# Patient Record
Sex: MALE | Race: WHITE | NOT HISPANIC OR LATINO | Employment: FULL TIME | URBAN - METROPOLITAN AREA
[De-identification: names, ages, dates, MRNs, and addresses within clinical notes are randomized per-mention and may not be internally consistent; named-entity substitution may affect disease eponyms.]

---

## 2023-03-12 ENCOUNTER — HOSPITAL ENCOUNTER (EMERGENCY)
Facility: MEDICAL CENTER | Age: 28
End: 2023-03-12
Attending: EMERGENCY MEDICINE

## 2023-03-12 ENCOUNTER — APPOINTMENT (OUTPATIENT)
Dept: RADIOLOGY | Facility: MEDICAL CENTER | Age: 28
End: 2023-03-12
Attending: EMERGENCY MEDICINE

## 2023-03-12 VITALS
WEIGHT: 153.66 LBS | HEIGHT: 73 IN | HEART RATE: 105 BPM | RESPIRATION RATE: 18 BRPM | OXYGEN SATURATION: 95 % | DIASTOLIC BLOOD PRESSURE: 66 MMHG | SYSTOLIC BLOOD PRESSURE: 106 MMHG | TEMPERATURE: 98.7 F | BODY MASS INDEX: 20.37 KG/M2

## 2023-03-12 DIAGNOSIS — S90.32XA CONTUSION OF LEFT FOOT, INITIAL ENCOUNTER: ICD-10-CM

## 2023-03-12 PROCEDURE — A9270 NON-COVERED ITEM OR SERVICE: HCPCS | Performed by: EMERGENCY MEDICINE

## 2023-03-12 PROCEDURE — 73630 X-RAY EXAM OF FOOT: CPT | Mod: LT

## 2023-03-12 PROCEDURE — 99284 EMERGENCY DEPT VISIT MOD MDM: CPT

## 2023-03-12 PROCEDURE — 700102 HCHG RX REV CODE 250 W/ 637 OVERRIDE(OP): Performed by: EMERGENCY MEDICINE

## 2023-03-12 PROCEDURE — 73610 X-RAY EXAM OF ANKLE: CPT | Mod: LT

## 2023-03-12 RX ORDER — ACETAMINOPHEN 325 MG/1
650 TABLET ORAL ONCE
Status: COMPLETED | OUTPATIENT
Start: 2023-03-12 | End: 2023-03-12

## 2023-03-12 RX ORDER — IBUPROFEN 600 MG/1
600 TABLET ORAL ONCE
Status: COMPLETED | OUTPATIENT
Start: 2023-03-12 | End: 2023-03-12

## 2023-03-12 RX ADMIN — IBUPROFEN 600 MG: 600 TABLET, FILM COATED ORAL at 18:55

## 2023-03-12 RX ADMIN — ACETAMINOPHEN 650 MG: 325 TABLET, FILM COATED ORAL at 18:54

## 2023-03-12 ASSESSMENT — PAIN DESCRIPTION - DESCRIPTORS: DESCRIPTORS: STABBING

## 2023-03-12 NOTE — Clinical Note
Calvin Jacksonbins was seen and treated in our emergency department on 3/12/2023.  He may return to work on 03/15/2023.       If you have any questions or concerns, please don't hesitate to call.      Rafi Gonzalez M.D.

## 2023-03-13 NOTE — ED PROVIDER NOTES
"ED Provider Note            Primary Care Provider: No primary care provider on file.    CHIEF COMPLAINT  Chief Complaint   Patient presents with    Foot Pain     L foot after dropping couch onto foot on Friday     LIMITATION TO HISTORY   Select: : None    HPI/ROS  OUTSIDE HISTORIAN(S):      EXTERNAL RECORDS REVIEWED      Calvin Gold is a 27 y.o. male who presents to the ED for left foot and ankle pain.  Patient dropped a couch while moving on his left foot and ankle 2 days prior.  Since that time has been unable to bear weight and has had progressive pain that he rates as severe.  Slightly better with rest elevation no other modifying factors no pain in any other extremities no other recent illness or concern.      PAST MEDICAL HISTORY  No past medical history on file.    SURGICAL HISTORY  No past surgical history on file.    FAMILY HISTORY  No family history on file.    SOCIAL HISTORY       CURRENT MEDICATIONS  Previous Medications    No medications on file       ALLERGIES  Penicillin g    PHYSICAL EXAM  /70   Pulse (!) 112   Temp 36.8 °C (98.2 °F) (Temporal)   Resp 18   Ht 1.854 m (6' 1\")   Wt 69.7 kg (153 lb 10.6 oz)   SpO2 95%   BMI 20.27 kg/m²   Pulse ox interpretation: I interpret this pulse ox as normal.  Constitutional: Alert and oriented x 3, minimal distress  HEENT: Atraumatic normocephalic, pupils are equal round reactive to light extraocular movements are intact. The nares is clear, external ears are normal, mouth shows moist mucous membranes normal dentition for age  Neck: Supple, no JVD no tracheal deviation  Cardiovascular: Regular rate and rhythm   Thorax & Lungs: No respiratory distress  GI: Soft nontender nondistended positive bowel sounds, no peritoneal signs  Skin: Warm dry no acute rash or lesion  Musculoskeletal: Bilateral upper and right lower extremity unremarkable left lower extremity shows moderate ecchymosis and edema bilateral malleoli into the dorsum of the left foot.  " "Pain with dorsiflexion plantarflexion of the ankle  normal cap refill and sensation all toes.  Neurologic: Cranial nerves III through XII are grossly intact no sensory deficit no cerebellar dysfunction   Psychiatric: Appropriate affect for situation at this time          DIAGNOSTIC STUDIES & PROCEDURES        Radiology:   The attending Emergency Physician has independently interpreted the diagnostic imaging associated with this visit and is awaiting the final reading from the radiologist, which will be displayed below.      DX-FOOT-COMPLETE 3+ LEFT   Final Result      No radiographic evidence of acute displaced fracture or subluxation.      DX-ANKLE 3+ VIEWS LEFT   Final Result      Normal ankle series.           COURSE & MEDICAL DECISION MAKING    ED Observation Status? No; Patient does not meet criteria for ED Observation.     ASSESSMENT AND PLAN  Care Narrative:       6:35 PM - Patient seen and evaluated at bedside.        ADDITIONAL PROBLEM LIST AND DISPOSITION               DISPOSITION AND DISCUSSIONS  I have discussed management of the patient with the following physicians and JAIDEN's:     Discussion of management with other QHP or appropriate source(s): None     Escalation of care considered, and ultimately not performed: .    Barriers to care at this time, including but not limited to: Patient does not have established PCP.     Decision tools and prescription drugs considered including, but not limited to: Pain Medications no clear indication for narcotic pain medicine at this time given instructions on combination acetaminophen and nonsteroidals. .      /66   Pulse (!) 105   Temp 37.1 °C (98.7 °F) (Temporal)   Resp 18   Ht 1.854 m (6' 1\")   Wt 69.7 kg (153 lb 10.6 oz)   SpO2 95%   BMI 20.27 kg/m²     FINAL IMPRESSION  1. Contusion of left foot, initial encounter        PRESCRIPTIONS  There are no discharge medications for this patient.      FOLLOW UP  Shay Peralta M.D.  [address]  [address]  [place] [id number] [phone]    In 1 week  if symptoms persist    [facility], Emergency Dept  [address]  [place] [address] [phone]    in 12-24 hours if symptoms persist, immediately If symptoms worsen, or if you develop any other symptoms or concerns        -DISCHARGE-

## 2023-03-13 NOTE — ED NOTES
Pt D/C to home.D/C instructions and prescriptions given. Pt v/u. Pt leaves ED with no acute changes, complaints or concerns.

## 2023-03-13 NOTE — ED TRIAGE NOTES
Patient presents with complaints of L foot pain after dropping a couch on it 2 days ago. Has been utilizing ice and elevation with little relief. Last used Motrin yesterday morning. Ambulatory with crutches at time of triage.

## 2023-07-05 ENCOUNTER — NON-PROVIDER VISIT (OUTPATIENT)
Dept: OCCUPATIONAL MEDICINE | Facility: CLINIC | Age: 28
End: 2023-07-05

## 2023-07-05 DIAGNOSIS — Z02.89 VISIT FOR OCCUPATIONAL HEALTH EXAMINATION: ICD-10-CM

## 2023-07-05 PROCEDURE — 8907 PR URINE COLLECT ONLY: Performed by: NURSE PRACTITIONER

## 2024-12-01 ENCOUNTER — HOSPITAL ENCOUNTER (EMERGENCY)
Facility: MEDICAL CENTER | Age: 29
End: 2024-12-02
Attending: EMERGENCY MEDICINE

## 2024-12-01 DIAGNOSIS — K80.70 CALCULUS OF GALLBLADDER AND BILE DUCT WITHOUT CHOLECYSTITIS OR OBSTRUCTION: ICD-10-CM

## 2024-12-01 DIAGNOSIS — R11.2 NAUSEA AND VOMITING, UNSPECIFIED VOMITING TYPE: ICD-10-CM

## 2024-12-01 DIAGNOSIS — R10.11 RIGHT UPPER QUADRANT ABDOMINAL PAIN: ICD-10-CM

## 2024-12-01 PROCEDURE — 99284 EMERGENCY DEPT VISIT MOD MDM: CPT

## 2024-12-01 PROCEDURE — 700111 HCHG RX REV CODE 636 W/ 250 OVERRIDE (IP): Mod: JZ | Performed by: EMERGENCY MEDICINE

## 2024-12-01 PROCEDURE — 85025 COMPLETE CBC W/AUTO DIFF WBC: CPT

## 2024-12-01 PROCEDURE — 83690 ASSAY OF LIPASE: CPT

## 2024-12-01 PROCEDURE — 36415 COLL VENOUS BLD VENIPUNCTURE: CPT

## 2024-12-01 PROCEDURE — 96374 THER/PROPH/DIAG INJ IV PUSH: CPT

## 2024-12-01 PROCEDURE — 96375 TX/PRO/DX INJ NEW DRUG ADDON: CPT

## 2024-12-01 PROCEDURE — 80053 COMPREHEN METABOLIC PANEL: CPT

## 2024-12-01 RX ORDER — MORPHINE SULFATE 4 MG/ML
4 INJECTION INTRAVENOUS ONCE
Status: COMPLETED | OUTPATIENT
Start: 2024-12-02 | End: 2024-12-01

## 2024-12-01 RX ORDER — ONDANSETRON 2 MG/ML
4 INJECTION INTRAMUSCULAR; INTRAVENOUS ONCE
Status: COMPLETED | OUTPATIENT
Start: 2024-12-02 | End: 2024-12-01

## 2024-12-01 RX ADMIN — MORPHINE SULFATE 4 MG: 4 INJECTION, SOLUTION INTRAMUSCULAR; INTRAVENOUS at 23:55

## 2024-12-01 RX ADMIN — ONDANSETRON 4 MG: 2 INJECTION INTRAMUSCULAR; INTRAVENOUS at 23:55

## 2024-12-01 ASSESSMENT — PAIN DESCRIPTION - PAIN TYPE
TYPE: ACUTE PAIN
TYPE: ACUTE PAIN

## 2024-12-02 ENCOUNTER — HOSPITAL ENCOUNTER (OUTPATIENT)
Dept: RADIOLOGY | Facility: MEDICAL CENTER | Age: 29
End: 2024-12-02
Attending: EMERGENCY MEDICINE

## 2024-12-02 VITALS
RESPIRATION RATE: 18 BRPM | OXYGEN SATURATION: 94 % | SYSTOLIC BLOOD PRESSURE: 97 MMHG | WEIGHT: 163.14 LBS | HEART RATE: 72 BPM | HEIGHT: 73 IN | BODY MASS INDEX: 21.62 KG/M2 | DIASTOLIC BLOOD PRESSURE: 53 MMHG | TEMPERATURE: 98.6 F

## 2024-12-02 LAB
ALBUMIN SERPL BCP-MCNC: 4.7 G/DL (ref 3.2–4.9)
ALBUMIN/GLOB SERPL: 1.6 G/DL
ALP SERPL-CCNC: 67 U/L (ref 30–99)
ALT SERPL-CCNC: 10 U/L (ref 2–50)
ANION GAP SERPL CALC-SCNC: 14 MMOL/L (ref 7–16)
APPEARANCE UR: ABNORMAL
AST SERPL-CCNC: 9 U/L (ref 12–45)
BACTERIA #/AREA URNS HPF: NORMAL /HPF
BASOPHILS # BLD AUTO: 0.8 % (ref 0–1.8)
BASOPHILS # BLD: 0.14 K/UL (ref 0–0.12)
BILIRUB SERPL-MCNC: <0.2 MG/DL (ref 0.1–1.5)
BILIRUB UR QL STRIP.AUTO: NEGATIVE
BUN SERPL-MCNC: 20 MG/DL (ref 8–22)
CALCIUM ALBUM COR SERPL-MCNC: 9.1 MG/DL (ref 8.5–10.5)
CALCIUM SERPL-MCNC: 9.7 MG/DL (ref 8.5–10.5)
CASTS URNS QL MICRO: NORMAL /LPF (ref 0–2)
CHLORIDE SERPL-SCNC: 100 MMOL/L (ref 96–112)
CO2 SERPL-SCNC: 25 MMOL/L (ref 20–33)
COLOR UR: YELLOW
CREAT SERPL-MCNC: 0.94 MG/DL (ref 0.5–1.4)
EOSINOPHIL # BLD AUTO: 0.24 K/UL (ref 0–0.51)
EOSINOPHIL NFR BLD: 1.3 % (ref 0–6.9)
EPITHELIAL CELLS 1715: NORMAL /HPF (ref 0–5)
ERYTHROCYTE [DISTWIDTH] IN BLOOD BY AUTOMATED COUNT: 44.1 FL (ref 35.9–50)
GFR SERPLBLD CREATININE-BSD FMLA CKD-EPI: 112 ML/MIN/1.73 M 2
GLOBULIN SER CALC-MCNC: 2.9 G/DL (ref 1.9–3.5)
GLUCOSE SERPL-MCNC: 105 MG/DL (ref 65–99)
GLUCOSE UR STRIP.AUTO-MCNC: NEGATIVE MG/DL
HCT VFR BLD AUTO: 46 % (ref 42–52)
HGB BLD-MCNC: 15.5 G/DL (ref 14–18)
IMM GRANULOCYTES # BLD AUTO: 0.19 K/UL (ref 0–0.11)
IMM GRANULOCYTES NFR BLD AUTO: 1 % (ref 0–0.9)
KETONES UR STRIP.AUTO-MCNC: NEGATIVE MG/DL
LEUKOCYTE ESTERASE UR QL STRIP.AUTO: NEGATIVE
LIPASE SERPL-CCNC: 22 U/L (ref 11–82)
LYMPHOCYTES # BLD AUTO: 3.62 K/UL (ref 1–4.8)
LYMPHOCYTES NFR BLD: 20 % (ref 22–41)
MCH RBC QN AUTO: 31.1 PG (ref 27–33)
MCHC RBC AUTO-ENTMCNC: 33.7 G/DL (ref 32.3–36.5)
MCV RBC AUTO: 92.4 FL (ref 81.4–97.8)
MICRO URNS: ABNORMAL
MONOCYTES # BLD AUTO: 0.87 K/UL (ref 0–0.85)
MONOCYTES NFR BLD AUTO: 4.8 % (ref 0–13.4)
NEUTROPHILS # BLD AUTO: 13.07 K/UL (ref 1.82–7.42)
NEUTROPHILS NFR BLD: 72.1 % (ref 44–72)
NITRITE UR QL STRIP.AUTO: NEGATIVE
NRBC # BLD AUTO: 0 K/UL
NRBC BLD-RTO: 0 /100 WBC (ref 0–0.2)
PH UR STRIP.AUTO: 7.5 [PH] (ref 5–8)
PLATELET # BLD AUTO: 316 K/UL (ref 164–446)
PMV BLD AUTO: 9.1 FL (ref 9–12.9)
POTASSIUM SERPL-SCNC: 3.8 MMOL/L (ref 3.6–5.5)
PROT SERPL-MCNC: 7.6 G/DL (ref 6–8.2)
PROT UR QL STRIP: NEGATIVE MG/DL
RBC # BLD AUTO: 4.98 M/UL (ref 4.7–6.1)
RBC # URNS HPF: NORMAL /HPF (ref 0–2)
RBC UR QL AUTO: NEGATIVE
SODIUM SERPL-SCNC: 139 MMOL/L (ref 135–145)
SP GR UR STRIP.AUTO: 1.02
UROBILINOGEN UR STRIP.AUTO-MCNC: 1 EU/DL
WBC # BLD AUTO: 18.1 K/UL (ref 4.8–10.8)
WBC #/AREA URNS HPF: NORMAL /HPF

## 2024-12-02 PROCEDURE — 76705 ECHO EXAM OF ABDOMEN: CPT

## 2024-12-02 PROCEDURE — 81001 URINALYSIS AUTO W/SCOPE: CPT

## 2024-12-02 PROCEDURE — 700111 HCHG RX REV CODE 636 W/ 250 OVERRIDE (IP): Mod: JZ | Performed by: EMERGENCY MEDICINE

## 2024-12-02 PROCEDURE — 96375 TX/PRO/DX INJ NEW DRUG ADDON: CPT

## 2024-12-02 RX ORDER — HALOPERIDOL 5 MG/ML
2.5 INJECTION INTRAMUSCULAR ONCE
Status: COMPLETED | OUTPATIENT
Start: 2024-12-02 | End: 2024-12-02

## 2024-12-02 RX ORDER — ONDANSETRON 4 MG/1
4 TABLET, ORALLY DISINTEGRATING ORAL EVERY 6 HOURS PRN
Qty: 5 TABLET | Refills: 0 | Status: SHIPPED | OUTPATIENT
Start: 2024-12-02

## 2024-12-02 RX ADMIN — HALOPERIDOL LACTATE 2.5 MG: 5 INJECTION, SOLUTION INTRAMUSCULAR at 02:05

## 2024-12-02 NOTE — ED TRIAGE NOTES
".  Chief Complaint   Patient presents with    Abdominal Pain     Right upper quadrant pain, n/v for the last few weeks, worse today       ./79   Pulse 72   Temp 37 °C (98.6 °F) (Temporal)   Resp 20   Ht 1.854 m (6' 1\")   Wt 74 kg (163 lb 2.3 oz)   SpO2 99%   BMI 21.52 kg/m²     .Pt is alert and oriented, speaking in full sentences, follows commands and responds appropriately to questions Resp are even and unlabored.  Skin pink warm and dry     Pt placed in lobby. Pt educated on triage process. Pt encouraged to alert staff for any changes.    "

## 2024-12-02 NOTE — ED PROVIDER NOTES
ED Provider Note    CHIEF COMPLAINT  Chief Complaint   Patient presents with    Abdominal Pain     Right upper quadrant pain, n/v for the last few weeks, worse today        HPI    Primary care provider: Pcp Pt States None   History obtained from: Patient  History limited by: None     Calvin Gold is a 29 y.o. male who presents to the ED complaining of pain localized to the right upper quadrant.  Patient reports that he has had intermittent episodes of pain over the past few weeks usually improved by applying a heating pad.  The pain became excruciating tonight after eating dinner and nothing was making it better which prompted him to come in.  He also reports 2 episodes of nausea and vomiting tonight without gross blood noted.  He denies fever but is feeling very warm due to the severe pain.  He reports normal bowel movements and denies hematochezia or melena.  He denies dysuria or hematuria.  He denies prior abdominal surgeries except for right inguinal hernia surgery.    REVIEW OF SYSTEMS  Please see HPI for pertinent positives/negatives.  All other systems reviewed and are negative.     PAST MEDICAL HISTORY  No past medical history on file.     SURGICAL HISTORY  No past surgical history on file.     SOCIAL HISTORY  Social History     Tobacco Use    Smoking status: Not on file    Smokeless tobacco: Not on file   Substance and Sexual Activity    Alcohol use: Not on file    Drug use: Not on file    Sexual activity: Not on file        FAMILY HISTORY  No family history on file.     CURRENT MEDICATIONS  Home Medications       Reviewed by Stephanie Lewis R.N. (Registered Nurse) on 12/01/24 at 2300  Med List Status: Not Addressed     Medication Last Dose Status        Patient Bayron Taking any Medications                            ALLERGIES  Allergies   Allergen Reactions    Penicillin G Swelling        PHYSICAL EXAM  VITAL SIGNS: BP 97/53   Pulse 72   Temp  "37 °C (98.6 °F) (Temporal)   Resp 18   Ht 1.854 m (6' 1\")   Wt 74 kg (163 lb 2.3 oz)   SpO2 94%   BMI 21.52 kg/m²  @ALBERTO[[id number]::@     Pulse ox interpretation: 99% I interpret this pulse ox as normal     Constitutional: Well developed, well nourished, alert in discomfort, nontoxic appearance    HENT: No external signs of trauma, normocephalic  Eyes: PERRL, conjunctiva without erythema, no discharge, no icterus    Neck: Soft and supple, trachea midline, no stridor, no tenderness, no LAD, good ROM    Cardiovascular: Regular rate and rhythm, no murmurs/rubs/gallops, strong distal pulses and good perfusion    Thorax & Lungs: No respiratory distress, CTAB    Abdomen: Soft, right upper quadrant tenderness to palpation, nondistended, no guarding, no rebound, normal BS    Back: No CVAT     Extremities: No cyanosis, no edema, no gross deformity, good ROM, intact distal pulses with brisk cap refill    Skin: Warm, dry, no pallor/cyanosis, no rash noted      Neuro: A/O times 3, no focal deficits noted    Psychiatric: Cooperative, slightly anxious      DIAGNOSTIC STUDIES / PROCEDURES        LABS  All labs reviewed by me.     Results for orders placed or performed during the hospital encounter of 12/01/24   CBC WITH DIFFERENTIAL    Collection Time: 12/01/24 11:26 PM   Result Value Ref Range    WBC 18.1 (H) 4.8 - 10.8 K/uL    RBC 4.98 4.70 - 6.10 M/uL    Hemoglobin 15.5 14.0 - 18.0 g/dL    Hematocrit 46.0 42.0 - 52.0 %    MCV 92.4 81.4 - 97.8 fL    MCH 31.1 27.0 - 33.0 pg    MCHC 33.7 32.3 - 36.5 g/dL    RDW 44.1 35.9 - 50.0 fL    Platelet Count 316 164 - 446 K/uL    MPV 9.1 9.0 - 12.9 fL    Neutrophils-Polys 72.10 (H) 44.00 - 72.00 %    Lymphocytes 20.00 (L) 22.00 - 41.00 %    Monocytes 4.80 0.00 - 13.40 %    Eosinophils 1.30 0.00 - 6.90 %    Basophils 0.80 0.00 - 1.80 %    Immature Granulocytes 1.00 (H) 0.00 - 0.90 %    Nucleated RBC 0.00 0.00 - 0.20 /100 WBC    Neutrophils (Absolute) 13.07 (H) 1.82 - 7.42 K/uL    Lymphs " (Absolute) 3.62 1.00 - 4.80 K/uL    Monos (Absolute) 0.87 (H) 0.00 - 0.85 K/uL    Eos (Absolute) 0.24 0.00 - 0.51 K/uL    Baso (Absolute) 0.14 (H) 0.00 - 0.12 K/uL    Immature Granulocytes (abs) 0.19 (H) 0.00 - 0.11 K/uL    NRBC (Absolute) 0.00 K/uL   COMP METABOLIC PANEL    Collection Time: 12/01/24 11:26 PM   Result Value Ref Range    Sodium 139 135 - 145 mmol/L    Potassium 3.8 3.6 - 5.5 mmol/L    Chloride 100 96 - 112 mmol/L    Co2 25 20 - 33 mmol/L    Anion Gap 14.0 7.0 - 16.0    Glucose 105 (H) 65 - 99 mg/dL    Bun 20 8 - 22 mg/dL    Creatinine 0.94 0.50 - 1.40 mg/dL    Calcium 9.7 8.5 - 10.5 mg/dL    Correct Calcium 9.1 8.5 - 10.5 mg/dL    AST(SGOT) 9 (L) 12 - 45 U/L    ALT(SGPT) 10 2 - 50 U/L    Alkaline Phosphatase 67 30 - 99 U/L    Total Bilirubin <0.2 0.1 - 1.5 mg/dL    Albumin 4.7 3.2 - 4.9 g/dL    Total Protein 7.6 6.0 - 8.2 g/dL    Globulin 2.9 1.9 - 3.5 g/dL    A-G Ratio 1.6 g/dL   LIPASE    Collection Time: 12/01/24 11:26 PM   Result Value Ref Range    Lipase 22 11 - 82 U/L   ESTIMATED GFR    Collection Time: 12/01/24 11:26 PM   Result Value Ref Range    GFR (CKD-EPI) 112 >60 mL/min/1.73 m 2   URINALYSIS    Collection Time: 12/02/24  2:51 AM    Specimen: Urine   Result Value Ref Range    Color Yellow     Character Cloudy (A)     Specific Gravity 1.023 <1.035    Ph 7.5 5.0 - 8.0    Glucose Negative Negative mg/dL    Ketones Negative Negative mg/dL    Protein Negative Negative mg/dL    Bilirubin Negative Negative    Urobilinogen, Urine 1.0 <=1.0 EU/dL    Nitrite Negative Negative    Leukocyte Esterase Negative Negative    Occult Blood Negative Negative    Micro Urine Req Microscopic    URINE MICROSCOPIC (W/UA)    Collection Time: 12/02/24  2:51 AM   Result Value Ref Range    WBC 0-2 /hpf    RBC 0-2 0 - 2 /hpf    Bacteria None Seen None /hpf    Epithelial Cells 0-2 0 - 5 /hpf    Urine Casts 0-2 0 - 2 /lpf        RADIOLOGY  I have independently interpreted the diagnostic imaging associated with this  visit and am waiting the final reading from the radiologist.     US-RUQ   Final Result         1.  Cholelithiasis with positive sonographic Gilman's sign but without additional sonographic findings of acute cholecystitis   2.  Hepatomegaly             COURSE & MEDICAL DECISION MAKING  Nursing notes, VSNIMCOHx reviewed in chart.     Review of past medical records shows the patient was seen at [facility] ED in March 12, 2023 with diagnosis of left foot contusion.  Patient was seen by occupational health on July 5, 2023 for occupational health exam.      Differential diagnoses considered include but are not limited to: Appendicitis, bowel perforation/obstruction, ileus, cholecystitis/ascending cholangitis, gallstone/biliary colic, pancreatitis, PUD, gastritis, KS/renal colic, colitis, muscle strain      ED Observation Status? No; Patient does not meet criteria for ED Observation.       Discussion of management with other QHP or appropriate source(s): None     Escalation of care considered, and ultimately not performed: acute inpatient care management, however at this time, the patient is most appropriate for outpatient management.     Barriers to care at this time, including but not limited to: Patient does not have established PCP.     Decision tools and prescription drugs considered including, but not limited to: Pain Medications   .        History and physical exam as above.  This is a 29-year-old male patient who denies any significant past medical problems and presents to the ED with above complaints.  Given location of his abdominal pain, ultrasound was obtained with findings as above.  Laboratory testing does not have indicate acute cholecystitis, cholangitis or biliary obstruction.  Leukocytosis is likely stress response rather than acute infectious etiology.  UA without overt signs of infection.  Patient was initially treated with Zofran and morphine and subsequently given low-dose Haldol.  He was able to  tolerate oral fluids without vomiting.  I discussed the findings with the patient.  On recheck, he is noted to be in no acute distress and nontoxic in appearance and is requesting discharge.  There is no evidence for acute abdomen.  At this time, no indications for emergent surgical intervention or admission.  He will be given outpatient referral to surgery for follow-up.  I discussed with him worrisome signs and symptoms and return to ED precautions.  He will be prescribed Zofran to use as needed for nausea.  He can use acetaminophen/ibuprofen as needed for pain.  Patient verbalized understanding and agreed with plan of care with no further questions or concerns.      The patient is referred to a primary physician for blood pressure management, diabetic screening, and for all other preventative health concerns.       FINAL IMPRESSION  1. Right upper quadrant abdominal pain Acute   2. Nausea and vomiting, unspecified vomiting type Acute   3. Calculus of gallbladder and bile duct without cholecystitis or obstruction Active          DISPOSITION  Patient will be discharged home in stable condition.       FOLLOW UP  [facility] ([facility]) - Primary Care and Family Medicine  [address] [facility] [address]  [phone]  Call today      [facility]  [address] [employer] [address]  [phone]  Call today      Armida Parrish M.D.  [address] [place] [address] [phone]    Call today      [facility], Emergency Dept  [address] [facility] [address] [phone]    If symptoms worsen         OUTPATIENT MEDICATIONS  Discharge Medication List as of 12/2/2024  3:20 AM        START taking these medications    Details   ondansetron (ZOFRAN ODT) 4 MG TABLET DISPERSIBLE Take 1 Tablet by mouth every 6 hours as needed for Nausea/Vomiting., Disp-5 Tablet, R-0, Normal                Electronically signed by: Thomas Quiñonez D.O., 12/1/2024  11:46 PM      Portions of this record were made with voice recognition software.  Despite my review, errors may remain.  Please interpret this chart in the appropriate context.

## 2024-12-02 NOTE — ED NOTES
Pt states the water made him feel nauseous and that the pain is bearable but he is still uncomfortable, ERP aware

## 2025-03-04 ENCOUNTER — HOSPITAL ENCOUNTER (EMERGENCY)
Facility: MEDICAL CENTER | Age: 30
End: 2025-03-04

## 2025-03-04 VITALS
BODY MASS INDEX: 23.86 KG/M2 | RESPIRATION RATE: 16 BRPM | OXYGEN SATURATION: 99 % | TEMPERATURE: 96.5 F | HEIGHT: 73 IN | DIASTOLIC BLOOD PRESSURE: 78 MMHG | WEIGHT: 180 LBS | HEART RATE: 73 BPM | SYSTOLIC BLOOD PRESSURE: 118 MMHG

## 2025-03-04 PROCEDURE — 302449 STATCHG TRIAGE ONLY (STATISTIC)

## 2025-03-04 ASSESSMENT — FIBROSIS 4 INDEX: FIB4 SCORE: 0.27

## 2025-03-04 NOTE — ED TRIAGE NOTES
"Chief Complaint   Patient presents with    Abdominal Pain     For 3 hours tonight. Gallbladder pain per patient, he thinks he has gallstones. Says he has been here twice for the same thing. Px wincing in pain in triage       30 yo male to triage for above complaint. Ambulatory. Abd pain protocol ordered    Pt is alert and oriented, speaking in full sentences, follows commands and responds appropriately to questions.     Patient placed back in lobby and educated on triage process. Asked to inform RN of any changes.    /78   Pulse 73   Temp 35.8 °C (96.5 °F) (Temporal)   Resp 16   Ht 1.854 m (6' 1\")   Wt 81.6 kg (180 lb)   SpO2 99%   BMI 23.75 kg/m²     "

## 2025-03-04 NOTE — ED NOTES
Called for patient three times with no response. Patient is not in the ER lobby, restroom, or the surrounding areas.

## 2025-03-04 NOTE — ED NOTES
Called for this patient three times with no response. Patient is not in the ER lobby, the restroom, or the surrounding area.

## 2025-04-04 ENCOUNTER — APPOINTMENT (OUTPATIENT)
Dept: RADIOLOGY | Facility: MEDICAL CENTER | Age: 30
End: 2025-04-04
Attending: EMERGENCY MEDICINE

## 2025-04-04 ENCOUNTER — HOSPITAL ENCOUNTER (EMERGENCY)
Facility: MEDICAL CENTER | Age: 30
End: 2025-04-04
Attending: EMERGENCY MEDICINE

## 2025-04-04 ENCOUNTER — PHARMACY VISIT (OUTPATIENT)
Dept: PHARMACY | Facility: MEDICAL CENTER | Age: 30
End: 2025-04-04
Payer: COMMERCIAL

## 2025-04-04 VITALS
HEIGHT: 73 IN | HEART RATE: 64 BPM | DIASTOLIC BLOOD PRESSURE: 75 MMHG | WEIGHT: 180 LBS | OXYGEN SATURATION: 100 % | BODY MASS INDEX: 23.86 KG/M2 | SYSTOLIC BLOOD PRESSURE: 130 MMHG | RESPIRATION RATE: 14 BRPM | TEMPERATURE: 97.9 F

## 2025-04-04 DIAGNOSIS — R10.13 EPIGASTRIC PAIN: ICD-10-CM

## 2025-04-04 DIAGNOSIS — R11.0 NAUSEA: ICD-10-CM

## 2025-04-04 DIAGNOSIS — Z87.19 HX OF GALLSTONES: ICD-10-CM

## 2025-04-04 LAB
ALBUMIN SERPL BCP-MCNC: 4.6 G/DL (ref 3.2–4.9)
ALBUMIN/GLOB SERPL: 1.5 G/DL
ALP SERPL-CCNC: 79 U/L (ref 30–99)
ALT SERPL-CCNC: 8 U/L (ref 2–50)
ANION GAP SERPL CALC-SCNC: 15 MMOL/L (ref 7–16)
AST SERPL-CCNC: 22 U/L (ref 12–45)
BASOPHILS # BLD AUTO: 1.2 % (ref 0–1.8)
BASOPHILS # BLD: 0.11 K/UL (ref 0–0.12)
BILIRUB SERPL-MCNC: <0.2 MG/DL (ref 0.1–1.5)
BUN SERPL-MCNC: 12 MG/DL (ref 8–22)
CALCIUM ALBUM COR SERPL-MCNC: 9.1 MG/DL (ref 8.5–10.5)
CALCIUM SERPL-MCNC: 9.6 MG/DL (ref 8.5–10.5)
CHLORIDE SERPL-SCNC: 103 MMOL/L (ref 96–112)
CO2 SERPL-SCNC: 21 MMOL/L (ref 20–33)
CREAT SERPL-MCNC: 0.96 MG/DL (ref 0.5–1.4)
EOSINOPHIL # BLD AUTO: 0.29 K/UL (ref 0–0.51)
EOSINOPHIL NFR BLD: 3.1 % (ref 0–6.9)
ERYTHROCYTE [DISTWIDTH] IN BLOOD BY AUTOMATED COUNT: 45 FL (ref 35.9–50)
ETHANOL BLD-MCNC: <10.1 MG/DL
GFR SERPLBLD CREATININE-BSD FMLA CKD-EPI: 109 ML/MIN/1.73 M 2
GLOBULIN SER CALC-MCNC: 3.1 G/DL (ref 1.9–3.5)
GLUCOSE SERPL-MCNC: 100 MG/DL (ref 65–99)
HCT VFR BLD AUTO: 44.5 % (ref 42–52)
HGB BLD-MCNC: 14.7 G/DL (ref 14–18)
IMM GRANULOCYTES # BLD AUTO: 0.06 K/UL (ref 0–0.11)
IMM GRANULOCYTES NFR BLD AUTO: 0.6 % (ref 0–0.9)
LIPASE SERPL-CCNC: 21 U/L (ref 11–82)
LYMPHOCYTES # BLD AUTO: 5.08 K/UL (ref 1–4.8)
LYMPHOCYTES NFR BLD: 54.9 % (ref 22–41)
MCH RBC QN AUTO: 30.7 PG (ref 27–33)
MCHC RBC AUTO-ENTMCNC: 33 G/DL (ref 32.3–36.5)
MCV RBC AUTO: 92.9 FL (ref 81.4–97.8)
MONOCYTES # BLD AUTO: 0.76 K/UL (ref 0–0.85)
MONOCYTES NFR BLD AUTO: 8.2 % (ref 0–13.4)
NEUTROPHILS # BLD AUTO: 2.96 K/UL (ref 1.82–7.42)
NEUTROPHILS NFR BLD: 32 % (ref 44–72)
NRBC # BLD AUTO: 0 K/UL
NRBC BLD-RTO: 0 /100 WBC (ref 0–0.2)
PLATELET # BLD AUTO: 403 K/UL (ref 164–446)
PMV BLD AUTO: 9.1 FL (ref 9–12.9)
POTASSIUM SERPL-SCNC: 3.9 MMOL/L (ref 3.6–5.5)
PROT SERPL-MCNC: 7.7 G/DL (ref 6–8.2)
RBC # BLD AUTO: 4.79 M/UL (ref 4.7–6.1)
SODIUM SERPL-SCNC: 139 MMOL/L (ref 135–145)
WBC # BLD AUTO: 9.3 K/UL (ref 4.8–10.8)

## 2025-04-04 PROCEDURE — 83690 ASSAY OF LIPASE: CPT

## 2025-04-04 PROCEDURE — RXMED WILLOW AMBULATORY MEDICATION CHARGE: Performed by: EMERGENCY MEDICINE

## 2025-04-04 PROCEDURE — 700102 HCHG RX REV CODE 250 W/ 637 OVERRIDE(OP): Performed by: EMERGENCY MEDICINE

## 2025-04-04 PROCEDURE — 700111 HCHG RX REV CODE 636 W/ 250 OVERRIDE (IP): Mod: JZ | Performed by: EMERGENCY MEDICINE

## 2025-04-04 PROCEDURE — 96375 TX/PRO/DX INJ NEW DRUG ADDON: CPT

## 2025-04-04 PROCEDURE — 700105 HCHG RX REV CODE 258: Performed by: EMERGENCY MEDICINE

## 2025-04-04 PROCEDURE — 96372 THER/PROPH/DIAG INJ SC/IM: CPT

## 2025-04-04 PROCEDURE — 80053 COMPREHEN METABOLIC PANEL: CPT

## 2025-04-04 PROCEDURE — 82077 ASSAY SPEC XCP UR&BREATH IA: CPT

## 2025-04-04 PROCEDURE — 85025 COMPLETE CBC W/AUTO DIFF WBC: CPT

## 2025-04-04 PROCEDURE — A9270 NON-COVERED ITEM OR SERVICE: HCPCS | Performed by: EMERGENCY MEDICINE

## 2025-04-04 PROCEDURE — 36415 COLL VENOUS BLD VENIPUNCTURE: CPT

## 2025-04-04 PROCEDURE — 96374 THER/PROPH/DIAG INJ IV PUSH: CPT

## 2025-04-04 PROCEDURE — 71045 X-RAY EXAM CHEST 1 VIEW: CPT

## 2025-04-04 PROCEDURE — 99285 EMERGENCY DEPT VISIT HI MDM: CPT

## 2025-04-04 RX ORDER — SUCRALFATE ORAL 1 G/10ML
1 SUSPENSION ORAL 4 TIMES DAILY
Qty: 560 ML | Refills: 0 | Status: SHIPPED | OUTPATIENT
Start: 2025-04-04 | End: 2025-04-18

## 2025-04-04 RX ORDER — DIPHENHYDRAMINE HYDROCHLORIDE 50 MG/ML
25 INJECTION, SOLUTION INTRAMUSCULAR; INTRAVENOUS ONCE
Status: COMPLETED | OUTPATIENT
Start: 2025-04-04 | End: 2025-04-04

## 2025-04-04 RX ORDER — OMEPRAZOLE 20 MG/1
20 CAPSULE, DELAYED RELEASE ORAL DAILY
Qty: 30 CAPSULE | Refills: 0 | Status: SHIPPED | OUTPATIENT
Start: 2025-04-04 | End: 2025-05-04

## 2025-04-04 RX ORDER — HALOPERIDOL 5 MG/ML
2.5 INJECTION INTRAMUSCULAR ONCE
Status: COMPLETED | OUTPATIENT
Start: 2025-04-04 | End: 2025-04-04

## 2025-04-04 RX ORDER — HYDROMORPHONE HYDROCHLORIDE 1 MG/ML
1 INJECTION, SOLUTION INTRAMUSCULAR; INTRAVENOUS; SUBCUTANEOUS ONCE
Status: COMPLETED | OUTPATIENT
Start: 2025-04-04 | End: 2025-04-04

## 2025-04-04 RX ORDER — ONDANSETRON 4 MG/1
4 TABLET, ORALLY DISINTEGRATING ORAL EVERY 6 HOURS PRN
Qty: 10 TABLET | Refills: 0 | Status: SHIPPED | OUTPATIENT
Start: 2025-04-04

## 2025-04-04 RX ORDER — SODIUM CHLORIDE 9 MG/ML
1000 INJECTION, SOLUTION INTRAVENOUS ONCE
Status: COMPLETED | OUTPATIENT
Start: 2025-04-04 | End: 2025-04-04

## 2025-04-04 RX ADMIN — DIPHENHYDRAMINE HYDROCHLORIDE 25 MG: 50 INJECTION, SOLUTION INTRAMUSCULAR; INTRAVENOUS at 01:05

## 2025-04-04 RX ADMIN — HYDROMORPHONE HYDROCHLORIDE 1 MG: 1 INJECTION, SOLUTION INTRAMUSCULAR; INTRAVENOUS; SUBCUTANEOUS at 03:47

## 2025-04-04 RX ADMIN — HALOPERIDOL LACTATE 2.5 MG: 5 INJECTION, SOLUTION INTRAMUSCULAR at 01:05

## 2025-04-04 RX ADMIN — SODIUM CHLORIDE 1000 ML: 9 INJECTION, SOLUTION INTRAVENOUS at 01:06

## 2025-04-04 RX ADMIN — LIDOCAINE HYDROCHLORIDE 30 ML: 20 SOLUTION ORAL; TOPICAL at 01:05

## 2025-04-04 RX ADMIN — FAMOTIDINE 20 MG: 10 INJECTION, SOLUTION INTRAVENOUS at 01:05

## 2025-04-04 ASSESSMENT — FIBROSIS 4 INDEX: FIB4 SCORE: 0.27

## 2025-04-04 ASSESSMENT — PAIN DESCRIPTION - PAIN TYPE
TYPE: ACUTE PAIN
TYPE: ACUTE PAIN

## 2025-04-04 NOTE — ED PROVIDER NOTES
ED Provider Note    CHIEF COMPLAINT  Chief Complaint   Patient presents with    Abdominal Pain     RUQ pain 10/10  Hx gallstones     EXTERNAL RECORDS REVIEWED  Prior ER note from 12/1/2024 when the patient had right upper quadrant discomfort with some nausea vomiting and during that workup the patient had normal vital signs, localizing discomfort in the right upper abdomen leading to lab work showing leukocytosis of 18 with normal LFTs and lipase.  Right upper quadrant ultrasound showed cholelithiasis with positive Gilman sign without evidence of acute cholecystitis.  With medications he improved, there was no findings to indicate cholangitis or biliary obstruction and it was recommended that he be treated symptomatically and given nonemergent surgical follow-up.    HPI/ROS  LIMITATION TO HISTORY   Select: : None  OUTSIDE HISTORIAN(S):  None    Calvin Gold is a 29 y.o. male who presents to the emergency room for evaluation of upper abdominal discomfort that started after he had eaten earlier today.  He has a history of some intermittent marijuana use, prior diagnosis of gallstones, says that after food he had gotten sharp abdominal pain going to the mid and right portion of his abdomen.  He says that he did not follow-up with recommended outpatient surgeon, says he occasionally has alcohol, occasionally has spicy foods but denies any recurrence since today since this had prior been diagnosed in December of last year.  He is denying any fevers, no chills, no urinary symptoms and denies any abdominal discomfort.    Throughout the encounter the patient has repeatedly asked for multiple doses of pain medications including fentanyl and Dilaudid.  Patient's heart rate is normal, patient is afebrile, he has no hypoxia and he is intermittently writhing and fidgeting in the bed.    PAST MEDICAL HISTORY   Prior abdominal pain, history of gallstones    SURGICAL HISTORY  patient denies any surgical history    FAMILY  "HISTORY  History reviewed. No pertinent family history.    SOCIAL HISTORY  Social History     Tobacco Use    Smoking status: Every Day     Types: Cigarettes    Smokeless tobacco: Never   Substance and Sexual Activity    Alcohol use: Not Currently    Drug use: Never    Sexual activity: Not on file       CURRENT MEDICATIONS  Home Medications       Reviewed by Charlette Lovelace R.N. (Registered Nurse) on 04/04/25 at 0029  Med List Status: Not Addressed     Medication Last Dose Status   ondansetron (ZOFRAN ODT) 4 MG TABLET DISPERSIBLE  Active                  Audit from Redirected Encounters    **Home medications have not yet been reviewed for this encounter**         ALLERGIES  Allergies   Allergen Reactions    Penicillin G Swelling       PHYSICAL EXAM  VITAL SIGNS: /75   Pulse 64   Temp 36.6 °C (97.9 °F) (Temporal)   Resp 14   Ht 1.854 m (6' 1\")   Wt 81.6 kg (180 lb)   SpO2 100%   BMI 23.75 kg/m²    Genl: M sitting in gurney uncomfortably, speaking clearly, appears agitated, in very mild distress   Head: NC/AT   ENT: Mucous membranes dry, posterior pharynx clear, uvula midline, nares patent bilaterally   Eyes: Normal sclera, pupils equal round reactive to light  Neck: Supple, FROM, no LAD appreciated   Pulmonary: Lungs are clear to auscultation bilaterally  Chest: No TTP  CV:  RRR (66), no murmur appreciated, pulses 2+ in both upper and lower extremities,  Abdomen: soft, gastric, inconsistent left upper and right upper discomfort.  No lower abdominal discomfort, no pain with bilateral straight leg raise.  Very distractible exam, no true flank discomfort.  No masses palpated, no HSM   : no CVA or suprapubic tenderness   Musculoskeletal: Pain free ROM of the neck. Moving upper and lower extremities in spontaneous and coordinated fashion  Neuro: A&Ox4 (person, place, time, situation), speech fluent, gait steady, no focal deficits appreciated, No cerebellar signs. Sensation is grossly intact in the " distal upper and lower extremities.  5/5 strength in  and dorsiflexion/plantar flexion of the ankles  Skin: No rash or lesions.  No pallor or jaundice.  No cyanosis.  Warm and dry.     EKG/LABS  Labs Reviewed   CBC WITH DIFFERENTIAL - Abnormal; Notable for the following components:       Result Value    Neutrophils-Polys 32.00 (*)     Lymphocytes 54.90 (*)     Lymphs (Absolute) 5.08 (*)     All other components within normal limits   COMP METABOLIC PANEL - Abnormal; Notable for the following components:    Glucose 100 (*)     All other components within normal limits   LIPASE   DIAGNOSTIC ALCOHOL   ESTIMATED GFR     RADIOLOGY/PROCEDURES   I have independently interpreted the diagnostic imaging associated with this visit and am waiting the final reading from the radiologist.   My preliminary interpretation is as follows: No sub-diaphragmatic air, no mediastinal abnormalities to suggest esophageal perforation.  No lower lobe pneumonia    Radiologist interpretation:  DX-CHEST-PORTABLE (1 VIEW)   Final Result         1.  No acute cardiopulmonary disease.        COURSE & MEDICAL DECISION MAKING    ASSESSMENT, COURSE AND PLAN  Epigastric abdominal pain: PUD, pancreatitis, gastritis, GERD, cholelithiasis, cholecystitis, choledocolithiasis   Care Narrative: Patient seen evaluated for symptoms as described above.  The patient has a prior workup with a priorEstablished history of gallstones and has my initial assessment the patient was somewhat irritable, was requesting pain medications excessively had no signs of acute tachycardia and had a very inconsistent exam with no signs of peritonitis.    With a known history of gallstones I did obtain lab work, have done serial reassessments however with his frequent request for pain medication because of his abdominal discomfort I have given him some atypical sodium channel blockade and then perform serial reassessments to make sure there is no other localizing abdominal  discomfort.  He was starting to have more of epigastric discomfort, burning lower chest discomfort with no radiation into the mid chest and no chest pressure sensations.  Lab work came back with no LFT elevations, no AGNIESZKA and no gross electrolyte abnormalities, leukocytosis or anemia.  There is no obstructive hepatobiliary process or acute pancreatitis and the patient's alcohol level is not elevated.  The patient is given GI cocktail with some interval improvement but continues to ask for narcotic pain medications.  Do not feel comfortable providing longstanding narcotic pain medications though with esophagitis and peptic ulcer disease I do feel comfortable giving him a dose of subcutaneous narcotic here we will transition him to p.o. coating medicines like Carafate and PPI medications.  Patient is given very strict return precautions, advised to follow-up with a primary care doctor and referral has been placed.  Discharged home in stable condition    Hydration: Based on the patient's presentation of Inability to take oral fluids the patient was given IV fluids. IV Hydration was used because oral hydration was not adequate alone. Upon recheck following hydration, the patient was improving.    DISPOSITION AND DISCUSSIONS  I have discussed management of the patient with the following physicians and JAIDEN's:  none    Discussion of management with other QHP or appropriate source(s): none     Escalation of care considered, and ultimately not performed:acute inpatient care management, however at this time, the patient is most appropriate for outpatient management    Barriers to care at this time, including but not limited to: Patient does not have established PCP.     Decision tools and prescription drugs considered including, but not limited to:  carafate/zofran/omeprazole .    FINAL DIAGNOSIS  1. Epigastric pain    2. Nausea    3. Hx of gallstones      Electronically signed by: Diego Pozo M.D., 4/4/2025 12:39 AM

## 2025-04-04 NOTE — ED TRIAGE NOTES
"Chief Complaint   Patient presents with    Abdominal Pain     RUQ pain 10/10  Hx gallstones       Pt BIB EMS from home to [facility]. Pt A&Ox4, RA.     Pt educated on alerting staff in changes to condition. Pt verbalized understanding.     BP (!) 149/80   Pulse (!) 50   Temp 36.6 °C (97.8 °F) (Temporal)   Resp 18   Ht 1.854 m (6' 1\")   Wt 81.6 kg (180 lb)   SpO2 98%   BMI 23.75 kg/m²     "

## 2025-05-22 ENCOUNTER — HOSPITAL ENCOUNTER (EMERGENCY)
Facility: MEDICAL CENTER | Age: 30
End: 2025-05-22

## 2025-05-22 VITALS
OXYGEN SATURATION: 94 % | TEMPERATURE: 99.3 F | WEIGHT: 166.23 LBS | HEART RATE: 114 BPM | RESPIRATION RATE: 16 BRPM | SYSTOLIC BLOOD PRESSURE: 120 MMHG | BODY MASS INDEX: 22.03 KG/M2 | HEIGHT: 73 IN | DIASTOLIC BLOOD PRESSURE: 81 MMHG

## 2025-05-22 PROCEDURE — 302449 STATCHG TRIAGE ONLY (STATISTIC)

## 2025-05-22 ASSESSMENT — FIBROSIS 4 INDEX: FIB4 SCORE: 0.58

## 2025-05-23 NOTE — ED NOTES
Pt called again in lobby, near phlebotomy station, family room, bathrooms checked, no answer. Pt will be dismissed.

## 2025-05-23 NOTE — ED TRIAGE NOTES
"Chief Complaint   Patient presents with    Hernia     Reports has chronic hernia in R groin area. Had mesh sx done in 2013 but recently has felt hernia coming back out and has been unable to push it back in. Denies pain unless attempts to push back in or put pressure on area.      /81   Pulse (!) 114   Temp 37.4 °C (99.3 °F) (Temporal)   Resp 16   Ht 1.854 m (6' 1\")   Wt 75.4 kg (166 lb 3.6 oz)   SpO2 94%   BMI 21.93 kg/m²     Pt ambulatory to triage w/ above complaint.   Denies any changes in urination, denies bleeding.   Placed pt back in lobby, educated on NPO status.     "